# Patient Record
(demographics unavailable — no encounter records)

---

## 2025-06-19 NOTE — ASSESSMENT
[FreeTextEntry1] : Bilateral X-Ray Examination of the KNEE (4 views): there are no fractures, subluxations or dislocations.   - We discussed their diagnosis and treatment options at length including the risks and benefits of both surgical and non-surgical options. Surgical risks include but are not limited to pain, infection, bleeding, vascular injury, numbness, tingling, nerve damage. - Due to the risk of surgery, they will continue conservative treatment with icing, anti-inflammatory medication, and PT - The patient was provided with a prescription to work on hip ER/abductors strengthening along with quad/hamstring stretches and strengthening - The patient was advised to let pain guide the gradual advancement of activities. - Follow up as needed in 6 weeks to re-evaluate.

## 2025-06-19 NOTE — IMAGING
[de-identified] :  LEFT KNEE Inspection:  minimal effusion Palpation: medial facet of the patella tenderness  Knee Range of Motion:  0-135 Strength: 5/5 Quadriceps strength, 5/5 Hamstring strength, 4/5 Hip Abductor strength Neurological: light touch is intact throughout Ligament Stability and Special Tests:  McMurrays: neg Lachman: neg Pivot Shift: neg Posterior Drawer: neg Valgus: neg Varus: neg Patella Apprehension: neg Patella Maltracking: neg     RIGHT KNEE Inspection:  minimal effusion Palpation: medial facet of the patella tenderness  Knee Range of Motion:  0-135 Strength: 5/5 Quadriceps strength, 5/5 Hamstring strength, 4/5 Hip Abductor strength Neurological: light touch is intact throughout Ligament Stability and Special Tests:  McMurrays: neg Lachman: neg Pivot Shift: neg Posterior Drawer: neg Valgus: neg Varus: neg Patella Apprehension: neg Patella Maltracking: neg

## 2025-06-19 NOTE — HISTORY OF PRESENT ILLNESS
[de-identified] : 15 year old male  ([employer], football, track, baseball)  chronic b/l knee pain since 2024,  worsening since 2025 without injury  The pain is located  anterior, deep The pain is associated with achiness, buckling  Worse with activity and better at rest. Has tried ice, heat, tylenol, advil